# Patient Record
Sex: MALE | Race: BLACK OR AFRICAN AMERICAN | NOT HISPANIC OR LATINO | URBAN - METROPOLITAN AREA
[De-identification: names, ages, dates, MRNs, and addresses within clinical notes are randomized per-mention and may not be internally consistent; named-entity substitution may affect disease eponyms.]

---

## 2017-08-13 ENCOUNTER — EMERGENCY (EMERGENCY)
Facility: HOSPITAL | Age: 27
LOS: 1 days | Discharge: HOME | End: 2017-08-13
Attending: EMERGENCY MEDICINE | Admitting: EMERGENCY MEDICINE
Payer: COMMERCIAL

## 2017-08-13 VITALS
DIASTOLIC BLOOD PRESSURE: 75 MMHG | SYSTOLIC BLOOD PRESSURE: 122 MMHG | TEMPERATURE: 98 F | HEART RATE: 70 BPM | RESPIRATION RATE: 17 BRPM | OXYGEN SATURATION: 99 %

## 2017-08-13 VITALS
TEMPERATURE: 98 F | SYSTOLIC BLOOD PRESSURE: 119 MMHG | RESPIRATION RATE: 16 BRPM | OXYGEN SATURATION: 100 % | HEART RATE: 63 BPM | DIASTOLIC BLOOD PRESSURE: 79 MMHG

## 2017-08-13 PROCEDURE — 99283 EMERGENCY DEPT VISIT LOW MDM: CPT | Mod: 25

## 2017-08-13 PROCEDURE — 99284 EMERGENCY DEPT VISIT MOD MDM: CPT

## 2017-08-13 PROCEDURE — 94640 AIRWAY INHALATION TREATMENT: CPT

## 2017-08-13 RX ORDER — ALBUTEROL 90 UG/1
2 AEROSOL, METERED ORAL
Qty: 1 | Refills: 0 | OUTPATIENT
Start: 2017-08-13 | End: 2017-08-20

## 2017-08-13 RX ORDER — OXYMETAZOLINE HYDROCHLORIDE 0.5 MG/ML
1 SPRAY NASAL
Qty: 1 | Refills: 0 | OUTPATIENT
Start: 2017-08-13 | End: 2017-08-16

## 2017-08-13 RX ORDER — ALBUTEROL 90 UG/1
2.5 AEROSOL, METERED ORAL ONCE
Qty: 0 | Refills: 0 | Status: COMPLETED | OUTPATIENT
Start: 2017-08-13 | End: 2017-08-13

## 2017-08-13 RX ADMIN — ALBUTEROL 2.5 MILLIGRAM(S): 90 AEROSOL, METERED ORAL at 12:12

## 2017-08-13 NOTE — ED PROVIDER NOTE - PHYSICAL EXAMINATION
Vitals: WNL  Gen: laying comfortably in NAD  Eyes:  sclerae white, anicterus, no conjunctival injections, no foreign bodies  ENT: Moist mucous membranes. No exudates, cobblestoning, no posterior pharyngeal erythema  Neck: supple, no LAD, mass or goiter, trachea midline, no carotid bruits, no JVD  CV: RRR. Audible S1 and S2. No murmurs, rubs, gallops, S3, nor S4, 2+ radial and DP pulses   Pulm: RLL rhonchi, otherwise Clear to auscultation bilaterally without wheezes, rales, or rhonchi  Abd: soft, normoactive BS, nondistended, nontender, no rebound, no guarding, no rashes,  Musculoskeletal:  No peripheral edema  Skin: no lesions or scars noted  Neurologic: AAOx3  : no CVA tenderness  Psych: no SI/HI

## 2017-08-13 NOTE — ED ADULT NURSE NOTE - OBJECTIVE STATEMENT
pt c/o URI x 1 week.  as per pt, he has been taking OTC meds, which helped with his fever but not the other s/s including sore throat, chest tightness and productive cough of thick, green phlegm.  pt is awake, alert and responsive to all stimuli.  no sob or respiratory distress noted at this time.  will continue to monitor.

## 2017-08-13 NOTE — ED PROVIDER NOTE - NS ED ROS FT
Constitutional: no fevers, chills  HEENT: no visual changes, + sore throat, + rhinorrhea  CV: +cough  Resp: no sob  GI: no abd pain, n/v, diarrhea/constipation  : no dysuria, hematuria  MSK: no joint pains  skin: no rashes  neuro: no HA  psych: no SI/HI

## 2017-08-13 NOTE — ED PROVIDER NOTE - PROGRESS NOTE DETAILS
pt reassessed. cough improved with albuterol. will dc with albuterol, afrin nasal spray to pt's pharmacy.

## 2017-08-13 NOTE — ED PROVIDER NOTE - MEDICAL DECISION MAKING DETAILS
25yo M no pmh p/w 2 wk hx of cough. DDx include postnasal drip, pna, bronchitis, strep. PE with cobble stoning suggests cough and sore throat may be attributed to the postnasal drip. Bronchitis also high on the ddx as pt recently had viral URI and cough developed after that. strep unlikely as pt does not have tonsillar exudates, fever, lymphadenopathy. pna also unlikely as pt looks well and without fever. will tx pt with albuterol inhaler and reassess. 25yo M no pmh p/w 2 wk hx of cough. DDx include postnasal drip, pna, bronchitis, strep. PE with cobble stoning suggests cough and sore throat may be attributed to the postnasal drip. Bronchitis also high on the ddx as pt recently had viral URI and cough developed after that. strep unlikely as pt does not have tonsillar exudates, fever, lymphadenopathy. pna also unlikely as pt looks well and without fever. will tx pt with albuterol inhaler and reassess.    JUANPABLO Holm MD: 27 y/o male with no sig PMH p/w 2 wk hx of productive cough. Per patient, he had fever initially, which has since resolved. Cough is productive with white-green sputum. Endorses sore throat, runny nose, and sensation of needing to frequently clear his throat. Denies fevers, chills, sob, CP, sick contacts, recent travel, abd pain, N/V/D. Has taken mucinex without improvement of sx.   Exam significant for: +cobblestoning to posterior oropharynx with midline uvula, without erythema or edema or exudates to tonsils. Lungs are CTAB with no w/r/r. Pt is well-appearing, non-toxic.  Given normal lung exam, no fevers, clinically, appears to have bronchitis, which is likely viral. Post-nasal drip may be also contributing to his cough. Cough also appears bronchospastic (worse when taking a deep breath). Will do trial of albuterol neb tx. If patients cough improves, will d/c with albuterol MDI and afrin and saline nasal sprays for symptomatic relief.

## 2017-08-13 NOTE — ED PROVIDER NOTE - OBJECTIVE STATEMENT
26y healthy M p/w 2 wk hx of productive cough. 2 wks ago pt developed fever that resolved after which cough developed. cough has not been improving or worsening. endorses sore throat, swollen tonsils, runny nose. CP from coughing too much. Denies fevers, chills, sob, sick contacts, recent travel, abd pain.

## 2017-08-13 NOTE — ED PROVIDER NOTE - PLAN OF CARE
1) Please use albuterol, afrin nasal spray and mucinex as prescribed for your cough. You have been diagnosed with bronchitis and postnasal discharge.    2)Please follow-up with your primary care doctor within the next 3 days.  Please call today or tomorrow for an appointment.  If you cannot follow-up with your doctor(s), please return to the ED for any urgent issues.  2) If you have any worsening of symptoms or any other concerns please return to the ED immediately.  3) Please continue taking your home medications as directed.  4) You may have been given a copy of your labs and/or imaging.  Please go over these with your primary care doctor.
